# Patient Record
Sex: MALE | Race: WHITE
[De-identification: names, ages, dates, MRNs, and addresses within clinical notes are randomized per-mention and may not be internally consistent; named-entity substitution may affect disease eponyms.]

---

## 2017-12-26 ENCOUNTER — HOSPITAL ENCOUNTER (EMERGENCY)
Dept: HOSPITAL 47 - EC | Age: 47
Discharge: HOME | End: 2017-12-26
Payer: COMMERCIAL

## 2017-12-26 VITALS
SYSTOLIC BLOOD PRESSURE: 167 MMHG | RESPIRATION RATE: 16 BRPM | DIASTOLIC BLOOD PRESSURE: 97 MMHG | TEMPERATURE: 99.1 F | HEART RATE: 76 BPM

## 2017-12-26 DIAGNOSIS — Z87.891: ICD-10-CM
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
## 2017-12-26 DIAGNOSIS — S20.212A: Primary | ICD-10-CM
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
## 2017-12-26 PROCEDURE — 99284 EMERGENCY DEPT VISIT MOD MDM: CPT

## 2017-12-26 PROCEDURE — 71020: CPT

## 2017-12-26 NOTE — ED
General Adult HPI





- General


Chief complaint: Fall


Stated complaint: fall,arm/rib injury


Time Seen by Provider: 12/26/17 04:40


Source: patient, RN notes reviewed, old records reviewed


Mode of arrival: ambulatory


Limitations: no limitations





- History of Present Illness


Initial comments: 





This is a 47-year-old male status post fall fall with left-sided rib pain.  No 

loss of consciousness about is a no significant source of breath left-sided rib 

pain currently.  No recent travel or known sick contacts





- Related Data


 Allergies











Allergy/AdvReac Type Severity Reaction Status Date / Time


 


No Known Allergies Allergy   Verified 12/26/17 04:19














Review of Systems


ROS Statement: 


Those systems with pertinent positive or pertinent negative responses have been 

documented in the HPI.





ROS Other: All systems not noted in ROS Statement are negative.





Past Medical History


Past Medical History: No Reported History


History of Any Multi-Drug Resistant Organisms: None Reported


Past Surgical History: Hernia Repair


Past Psychological History: No Psychological Hx Reported


Smoking Status: Former smoker


Past Alcohol Use History: None Reported


Past Drug Use History: Marijuana





General Exam





- General Exam Comments


Initial Comments: 





Left rib pain


Limitations: no limitations


General appearance: alert, in no apparent distress


Head exam: Present: atraumatic, normocephalic, normal inspection


Eye exam: Present: normal appearance, PERRL, EOMI.  Absent: scleral icterus, 

conjunctival injection, periorbital swelling


ENT exam: Present: normal exam, mucous membranes moist


Neck exam: Present: normal inspection.  Absent: tenderness, meningismus, 

lymphadenopathy


Respiratory exam: Present: normal lung sounds bilaterally.  Absent: respiratory 

distress, wheezes, rales, rhonchi, stridor


Cardiovascular Exam: Present: regular rate, normal rhythm, normal heart sounds.

  Absent: systolic murmur, diastolic murmur, rubs, gallop, clicks


GI/Abdominal exam: Present: soft, normal bowel sounds.  Absent: distended, 

tenderness, guarding, rebound, rigid


Extremities exam: Present: normal inspection, full ROM, normal capillary 

refill.  Absent: tenderness, pedal edema, joint swelling, calf tenderness


Back exam: Present: normal inspection


Neurological exam: Present: alert, oriented X3, CN II-XII intact


Psychiatric exam: Present: normal affect, normal mood


Skin exam: Present: warm, dry, intact, normal color.  Absent: rash





Course





 Vital Signs











  12/26/17





  04:15


 


Temperature 99.1 F


 


Pulse Rate 76


 


Respiratory 16





Rate 


 


Blood Pressure 167/97


 


O2 Sat by Pulse 99





Oximetry 














- Reevaluation(s)


Reevaluation #1: 





12/26/17 04:41


Patient is in no acute distress





Medical Decision Making





- Medical Decision Making





47-year-old ER for evaluation.  Patient is ER for evaluation of left rib 

fracture.  Left rib pain.  X-ray negative.  Patient can be discharged home





- Radiology Data


Radiology results: report reviewed (Chest x-ray is negative), image reviewed





Disposition


Clinical Impression: 


 Fall, Contusion of rib on left side





Disposition: HOME SELF-CARE


Instructions:  Fall Prevention for Older Adults (ED), Rib Contusion (ED)


Referrals: 


Guanaco Boyd MD [Primary Care Provider] - 1-2 days

## 2017-12-26 NOTE — XR
EXAM:

  XR Chest, 2 Views

 

CLINICAL HISTORY:

  Reason: Pain

 

TECHNIQUE:

  Frontal and lateral views of the chest.

 

COMPARISON:

  No relevant prior studies available.

 

FINDINGS:

  Lungs:  Unremarkable.  No consolidation.

  Pleural space:  Unremarkable.  No pneumothorax.

  Heart:  Unremarkable.  No cardiomegaly.

  Mediastinum:  Unremarkable.

  Bones/joints:  Unremarkable.

 

IMPRESSION:     

  Normal chest x-rays.

## 2021-04-03 ENCOUNTER — HOSPITAL ENCOUNTER (EMERGENCY)
Dept: HOSPITAL 47 - EC | Age: 51
Discharge: HOME | End: 2021-04-03
Payer: COMMERCIAL

## 2021-04-03 VITALS
TEMPERATURE: 97.9 F | SYSTOLIC BLOOD PRESSURE: 127 MMHG | DIASTOLIC BLOOD PRESSURE: 74 MMHG | RESPIRATION RATE: 20 BRPM | HEART RATE: 100 BPM

## 2021-04-03 DIAGNOSIS — S01.511A: Primary | ICD-10-CM
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
## 2021-04-03 DIAGNOSIS — I10: ICD-10-CM

## 2021-04-03 DIAGNOSIS — F12.90: ICD-10-CM

## 2021-04-03 PROCEDURE — 99283 EMERGENCY DEPT VISIT LOW MDM: CPT

## 2021-04-03 NOTE — ED
Wound/Laceration HPI





- General


Chief Complaint: Wound/Laceration


Stated Complaint: Lip Lac


Time Seen by Provider: 04/03/21 15:00


Source: patient, family


Mode of arrival: ambulatory





- History of Present Illness


Initial Comments: 





Patient is a 50-year-old male presenting to the emergency Department with 

complaints of a laceration to his lower lip.  Patient states she was out in the 

woods cutting some wood when he tripped and he fell forward and a stick cut his 

lower lip.  He denies being on blood thinners, bleeding is controlled at this 

time.  This happened about 1 hour prior to arrival.  He is up-to-date with his 

tetanus vaccine, he had 3 years ago.  He has no further complaints at this time.





- Related Data


                                    Allergies











Allergy/AdvReac Type Severity Reaction Status Date / Time


 


No Known Allergies Allergy   Verified 04/03/21 14:31














Review of Systems


ROS Statement: 


Those systems with pertinent positive or pertinent negative responses have been 

documented in the HPI.





ROS Other: All systems not noted in ROS Statement are negative.





Past Medical History


Past Medical History: Hypertension


History of Any Multi-Drug Resistant Organisms: None Reported


Past Surgical History: Hernia Repair


Past Psychological History: No Psychological Hx Reported


Smoking Status: Never smoker


Past Alcohol Use History: Occasional


Past Drug Use History: Marijuana





General Exam





- General Exam Comments


Initial Comments: 





GENERAL: 


Patient is well-developed and well-nourished.  Patient is nontoxic and in no 

acute distress.





HEAD: 


Atraumatic, normocephalic.





EYES:


Pupils equal round and reactive to light, extraocular movements intact, sclera 

anicteric, conjunctiva are normal.  Eyelids were unremarkable.





ENT: 


Nares patent, oropharynx clear without exudates.  Moist mucous membranes.





NECK: 


Normal range of motion, supple without lymphadenopathy or JVD.





LUNGS:


Unlabored respirations.  Breath sounds clear to auscultation bilaterally and 

equal.  No wheezes rales or rhonchi.





HEART:


Regular rate and rhythm without murmurs, rubs or gallops.





ABDOMEN: 


Soft, nontender, normoactive bowel sounds.  No guarding, no rebound.  No masses 

appreciated.





: Deferred 





MUSCULOSKELETAL: 


Normal extremities with adequate strength and normal range of motion, no pitting

or edema.  No clubbing or cyanosis.





NEUROLOGICAL: 


Patient is alert and oriented x 3.  Motor and sensory are also intact.  Cranial 

nerves II through XII grossly intact.  Symmetrical smile.  Normal speech, normal

gait.   





PSYCH:


Normal mood, normal affect.





SKIN:


 Warm, Dry, normal turgor, no rashes.  Patient has a 1 cm laceration that is 

vertical in nature on the left lower lip, it does cross the vermilion border.





Course


                                   Vital Signs











  04/03/21





  14:29


 


Temperature 97.9 F


 


Pulse Rate 100


 


Respiratory 20





Rate 


 


Blood Pressure 127/74


 


O2 Sat by Pulse 99





Oximetry 














Procedures





- Laceration


  ** Laceration #1


Consent Obtained: verbal consent


Indication: laceration


Site: lip (Left lower lip)


Size (cm): 1


Description: linear, involves vermillion border


Depth: simple, single layer


Anesthetic Used: lidocaine 1%


Anesthesia Technique: local infiltration


Amount (mls): 2


Pre-repair: irrigated extensively


Type of Sutures: nylon


Size of Sutures: 6-0


Number of Sutures: 3


Technique: simple, interrupted


Patient Tolerated Procedure: well





Medical Decision Making





- Medical Decision Making





Patient is a 50-year-old male presenting with a 1 cm laceration to his left 

lower lip.  Bleeding is controlled.  He is up-to-date with his tetanus vaccine. 

Patient's wound was cleaned, closed with 3, 6-0 sutures.  Patient tolerated 

procedure well.  He will have sutures removed in 5-7 days.  He is stable for 

discharge.  Case discussed with Dr. Jean. 





Disposition


Clinical Impression: 


 Laceration of vermilion border of lower lip





Disposition: HOME SELF-CARE


Condition: Stable


Instructions (If sedation given, give patient instructions):  Care For Your 

Stitches (ED)


Additional Instructions: 


Please return to the Emergency Department if symptoms worsen or any other 

concerns.


Stitches need to be removed in 5-7 days.


Keep area clean and dry.  Apply topical antibiotic.


Is patient prescribed a controlled substance at d/c from ED?: No


Referrals: 


Guanaco Boyd MD [Primary Care Provider] - 1-2 days


Time of Disposition: 15:54